# Patient Record
Sex: FEMALE | Race: OTHER | NOT HISPANIC OR LATINO | URBAN - METROPOLITAN AREA
[De-identification: names, ages, dates, MRNs, and addresses within clinical notes are randomized per-mention and may not be internally consistent; named-entity substitution may affect disease eponyms.]

---

## 2018-06-26 ENCOUNTER — EMERGENCY (EMERGENCY)
Facility: HOSPITAL | Age: 43
LOS: 1 days | Discharge: HOME | End: 2018-06-26
Attending: EMERGENCY MEDICINE
Payer: COMMERCIAL

## 2018-06-26 VITALS
HEART RATE: 82 BPM | SYSTOLIC BLOOD PRESSURE: 142 MMHG | RESPIRATION RATE: 17 BRPM | OXYGEN SATURATION: 99 % | DIASTOLIC BLOOD PRESSURE: 86 MMHG

## 2018-06-26 VITALS — HEIGHT: 61 IN | WEIGHT: 143.08 LBS

## 2018-06-26 LAB
ALBUMIN SERPL ELPH-MCNC: 3.7 G/DL — SIGNIFICANT CHANGE UP (ref 3.3–5.2)
ALP SERPL-CCNC: 64 U/L — SIGNIFICANT CHANGE UP (ref 40–120)
ALT FLD-CCNC: 15 U/L — SIGNIFICANT CHANGE UP
ANION GAP SERPL CALC-SCNC: 12 MMOL/L — SIGNIFICANT CHANGE UP (ref 5–17)
APPEARANCE UR: ABNORMAL
AST SERPL-CCNC: 18 U/L — SIGNIFICANT CHANGE UP
BACTERIA # UR AUTO: ABNORMAL
BASOPHILS # BLD AUTO: 0 K/UL — SIGNIFICANT CHANGE UP (ref 0–0.2)
BASOPHILS NFR BLD AUTO: 0.4 % — SIGNIFICANT CHANGE UP (ref 0–2)
BILIRUB SERPL-MCNC: 0.3 MG/DL — LOW (ref 0.4–2)
BILIRUB UR-MCNC: NEGATIVE — SIGNIFICANT CHANGE UP
BUN SERPL-MCNC: 16 MG/DL — SIGNIFICANT CHANGE UP (ref 8–20)
CALCIUM SERPL-MCNC: 9.5 MG/DL — SIGNIFICANT CHANGE UP (ref 8.6–10.2)
CHLORIDE SERPL-SCNC: 102 MMOL/L — SIGNIFICANT CHANGE UP (ref 98–107)
CO2 SERPL-SCNC: 24 MMOL/L — SIGNIFICANT CHANGE UP (ref 22–29)
COLOR SPEC: ABNORMAL
CREAT SERPL-MCNC: 0.86 MG/DL — SIGNIFICANT CHANGE UP (ref 0.5–1.3)
DIFF PNL FLD: ABNORMAL
EOSINOPHIL # BLD AUTO: 0.1 K/UL — SIGNIFICANT CHANGE UP (ref 0–0.5)
EOSINOPHIL NFR BLD AUTO: 1 % — SIGNIFICANT CHANGE UP (ref 0–6)
EPI CELLS # UR: SIGNIFICANT CHANGE UP
GLUCOSE SERPL-MCNC: 88 MG/DL — SIGNIFICANT CHANGE UP (ref 70–115)
GLUCOSE UR QL: NEGATIVE MG/DL — SIGNIFICANT CHANGE UP
HCG SERPL-ACNC: <5 MIU/ML — SIGNIFICANT CHANGE UP
HCG UR QL: NEGATIVE — SIGNIFICANT CHANGE UP
HCT VFR BLD CALC: 36.5 % — LOW (ref 37–47)
HGB BLD-MCNC: 12.2 G/DL — SIGNIFICANT CHANGE UP (ref 12–16)
KETONES UR-MCNC: NEGATIVE — SIGNIFICANT CHANGE UP
LEUKOCYTE ESTERASE UR-ACNC: ABNORMAL
LYMPHOCYTES # BLD AUTO: 1.5 K/UL — SIGNIFICANT CHANGE UP (ref 1–4.8)
LYMPHOCYTES # BLD AUTO: 14 % — LOW (ref 20–55)
MCHC RBC-ENTMCNC: 31.8 PG — HIGH (ref 27–31)
MCHC RBC-ENTMCNC: 33.4 G/DL — SIGNIFICANT CHANGE UP (ref 32–36)
MCV RBC AUTO: 95.1 FL — SIGNIFICANT CHANGE UP (ref 81–99)
MONOCYTES # BLD AUTO: 0.5 K/UL — SIGNIFICANT CHANGE UP (ref 0–0.8)
MONOCYTES NFR BLD AUTO: 4.4 % — SIGNIFICANT CHANGE UP (ref 3–10)
NEUTROPHILS # BLD AUTO: 8.3 K/UL — HIGH (ref 1.8–8)
NEUTROPHILS NFR BLD AUTO: 80 % — HIGH (ref 37–73)
NITRITE UR-MCNC: POSITIVE
PH UR: 8 — SIGNIFICANT CHANGE UP (ref 5–8)
PLATELET # BLD AUTO: 305 K/UL — SIGNIFICANT CHANGE UP (ref 150–400)
POTASSIUM SERPL-MCNC: 4.4 MMOL/L — SIGNIFICANT CHANGE UP (ref 3.5–5.3)
POTASSIUM SERPL-SCNC: 4.4 MMOL/L — SIGNIFICANT CHANGE UP (ref 3.5–5.3)
PROT SERPL-MCNC: 7.1 G/DL — SIGNIFICANT CHANGE UP (ref 6.6–8.7)
PROT UR-MCNC: 30 MG/DL
RBC # BLD: 3.84 M/UL — LOW (ref 4.4–5.2)
RBC # FLD: 13.6 % — SIGNIFICANT CHANGE UP (ref 11–15.6)
RBC CASTS # UR COMP ASSIST: >50 /HPF (ref 0–4)
SODIUM SERPL-SCNC: 138 MMOL/L — SIGNIFICANT CHANGE UP (ref 135–145)
SP GR SPEC: 1.01 — SIGNIFICANT CHANGE UP (ref 1.01–1.02)
UROBILINOGEN FLD QL: NEGATIVE MG/DL — SIGNIFICANT CHANGE UP
WBC # BLD: 10.4 K/UL — SIGNIFICANT CHANGE UP (ref 4.8–10.8)
WBC # FLD AUTO: 10.4 K/UL — SIGNIFICANT CHANGE UP (ref 4.8–10.8)
WBC UR QL: ABNORMAL

## 2018-06-26 PROCEDURE — 76856 US EXAM PELVIC COMPLETE: CPT

## 2018-06-26 PROCEDURE — 36415 COLL VENOUS BLD VENIPUNCTURE: CPT

## 2018-06-26 PROCEDURE — 76830 TRANSVAGINAL US NON-OB: CPT | Mod: 26

## 2018-06-26 PROCEDURE — 80053 COMPREHEN METABOLIC PANEL: CPT

## 2018-06-26 PROCEDURE — 76830 TRANSVAGINAL US NON-OB: CPT

## 2018-06-26 PROCEDURE — 81025 URINE PREGNANCY TEST: CPT

## 2018-06-26 PROCEDURE — 85027 COMPLETE CBC AUTOMATED: CPT

## 2018-06-26 PROCEDURE — 99284 EMERGENCY DEPT VISIT MOD MDM: CPT

## 2018-06-26 PROCEDURE — 76856 US EXAM PELVIC COMPLETE: CPT | Mod: 26

## 2018-06-26 PROCEDURE — 84702 CHORIONIC GONADOTROPIN TEST: CPT

## 2018-06-26 PROCEDURE — 81001 URINALYSIS AUTO W/SCOPE: CPT

## 2018-06-26 RX ORDER — SODIUM CHLORIDE 9 MG/ML
3 INJECTION INTRAMUSCULAR; INTRAVENOUS; SUBCUTANEOUS ONCE
Qty: 0 | Refills: 0 | Status: COMPLETED | OUTPATIENT
Start: 2018-06-26 | End: 2018-06-26

## 2018-06-26 RX ORDER — ACETAMINOPHEN 500 MG
650 TABLET ORAL ONCE
Qty: 0 | Refills: 0 | Status: COMPLETED | OUTPATIENT
Start: 2018-06-26 | End: 2018-06-26

## 2018-06-26 RX ORDER — NITROFURANTOIN MACROCRYSTAL 50 MG
100 CAPSULE ORAL ONCE
Qty: 0 | Refills: 0 | Status: DISCONTINUED | OUTPATIENT
Start: 2018-06-26 | End: 2018-06-26

## 2018-06-26 RX ADMIN — Medication 650 MILLIGRAM(S): at 14:40

## 2018-06-26 RX ADMIN — SODIUM CHLORIDE 3 MILLILITER(S): 9 INJECTION INTRAMUSCULAR; INTRAVENOUS; SUBCUTANEOUS at 14:32

## 2018-06-26 NOTE — ED PROVIDER NOTE - PROGRESS NOTE DETAILS
pt improved. labs and us reviewed. results reviewed with pt including all abnormal results. pt given a copy of results and advised to follow up with obgyn and take ibuprofen for pain. All questions answered and concerns addressed. pt verbalized understanding and agreement with plan and dx. pt advised to follow up with PMD. pt advised to return to ed for worsenng symptoms including fever, cp, sob. will dc.

## 2018-06-26 NOTE — ED PROVIDER NOTE - ATTENDING CONTRIBUTION TO CARE
seen with acp  c/o pelvic pain and bleeding lmp was early June  PE abd sot non tender bs active  cbc ok  ultra sound shows ovarian cyst  will refer to gyn  Agree with acps assessment hx and physical

## 2018-06-26 NOTE — ED PROVIDER NOTE - OBJECTIVE STATEMENT
Pt is a 44yo female with no significant pmhx c/o vaginal bleeding x today. Pt is a 42yo female with no significant pmhx c/o vaginal bleeding x today. pt reports she had her menses first week in june and started to have vaginal bleeding again today, used 4 pads with 1 clot. pt reports Pt is a 42yo female with no significant pmhx c/o vaginal bleeding x today. pt reports she had her menses first week in june and started to have vaginal bleeding again today, used 4 pads with 1 clot. pt reports she is unsure if she is pregnant. pt denies fever, cp, sob, dysuria.

## 2018-06-26 NOTE — ED PROVIDER NOTE - PHYSICAL EXAMINATION
GYN: External genitalia is without lesions, masses or swelling. Mucosa pink. + mild blood  noted in the vault. Cervical OS closed without lesions with mild bleeding. No CMT or adnexal tenderness. Uterus is non-tender and of normal size, shape and consistency. No masses noted.  yue arroyo rn

## 2018-06-26 NOTE — ED ADULT NURSE NOTE - OBJECTIVE STATEMENT
received pt AOx4 in supertrack, c/o abd cramping, dizziness, lethargy and heavy vaginal bleeding. pt states soaked 4 pads in 1 hour. pt denies chest pain and SOB. pt denies fevers, chills, visual changes, n/v/d.  resp even unlabored, in no distress, MAEx4, neuro intact. will continue to monitor

## 2018-06-26 NOTE — ED ADULT TRIAGE NOTE - CHIEF COMPLAINT QUOTE
"I am vaginal bleeding and I don't know if I was pregnant. I had my period Rosa 10- Rosa 15 then it stopped and today I started bleeding, I soaked through 4 pads in 2 hours and am bleeding heavy I have blood clots. "

## 2018-06-26 NOTE — ED PROVIDER NOTE - NONTENDER LOCATION
left lower quadrant/right lower quadrant/periumbilical/left upper quadrant/right upper quadrant/umbilical

## 2018-08-09 ENCOUNTER — EMERGENCY (EMERGENCY)
Facility: HOSPITAL | Age: 43
LOS: 1 days | Discharge: HOME | End: 2018-08-09
Attending: EMERGENCY MEDICINE
Payer: COMMERCIAL

## 2018-08-09 VITALS — HEIGHT: 62 IN | WEIGHT: 145.06 LBS

## 2018-08-09 VITALS
SYSTOLIC BLOOD PRESSURE: 127 MMHG | TEMPERATURE: 98 F | HEART RATE: 70 BPM | DIASTOLIC BLOOD PRESSURE: 83 MMHG | RESPIRATION RATE: 17 BRPM | OXYGEN SATURATION: 99 %

## 2018-08-09 PROCEDURE — 99283 EMERGENCY DEPT VISIT LOW MDM: CPT

## 2018-08-09 RX ADMIN — Medication 1 TABLET(S): at 17:46

## 2018-08-09 NOTE — ED PROVIDER NOTE - NS ED ROS FT
no fever  no chest pain  + ear pain  no SOB  no abd pain  no HA  All other ROS negative except as per HPI

## 2018-08-09 NOTE — ED PROVIDER NOTE - PHYSICAL EXAMINATION
Constitutional: Alert, NAD.   ENT: Airway patent. Nose clear. Mouth with normal mucosa. + erythema, bulging and fluid behind right TM consistent with AOM  Head: Atraumatic.   Eyes: Clear bilaterally. PERRL.   Cardiac: Normal rate.   Respiratory: Breath sounds clear bilaterally.   GI: Abdomen soft, non-tender, no guarding.   : No CVA or bladder tenderness.   Musculoskeletal: FROM, no muscle or joint tenderness or swelling.   Neuro: alert and oriented, no focal deficits, no motor or sensory deficits.   Skin: Dry, intact, no rash.   Psych: normal mood and affect.

## 2018-08-09 NOTE — ED ADULT NURSE NOTE - NSIMPLEMENTINTERV_GEN_ALL_ED
Implemented All Universal Safety Interventions:  [place] to call system. Call bell, personal items and telephone within reach. Instruct patient to call for assistance. Room bathroom lighting operational. Non-slip footwear when patient is off stretcher. Physically safe environment: no spills, clutter or unnecessary equipment. Stretcher in lowest position, wheels locked, appropriate side rails in place.

## 2018-08-09 NOTE — ED PROVIDER NOTE - OBJECTIVE STATEMENT
CC: ear pain  Presenting symptoms: 42yo female c/o right ear pain since last night and had small amount of drainage from ear earlier.  Pertinent Positives: ear pain  Pertinent Negatives: no fever, no abd pain, no cough  Timing: yesterday  Quality: aching  Radiation: none  Severity: moderate  Aggravating Factors: swallowing  Relieving Factors: none    Patient states usually gets one ear infection per year.

## 2018-08-09 NOTE — ED ADULT NURSE NOTE - OBJECTIVE STATEMENT
pt presents to ED with right ear pain since yesterday. pt states there is pus draining from ear. afebrile. will continue to monitor and reassess

## 2018-09-12 NOTE — ED ADULT NURSE NOTE - NSFALLRSKOUTCOME_ED_ALL_ED
Minocycline Pregnancy And Lactation Text: This medication is Pregnancy Category D and not consider safe during pregnancy. It is also excreted in breast milk. Dapsone Pregnancy And Lactation Text: This medication is Pregnancy Category C and is not considered safe during pregnancy or breast feeding. Detail Level: Zone Birth Control Pills Pregnancy And Lactation Text: This medication should be avoided if pregnant and for the first 30 days post-partum. Spironolactone Counseling: Patient advised regarding risks of diarrhea, abdominal pain, hyperkalemia, birth defects (for female patients), liver toxicity and renal toxicity. The patient may need blood work to monitor liver and kidney function and potassium levels while on therapy. The patient verbalized understanding of the proper use and possible adverse effects of spironolactone.  All of the patient's questions and concerns were addressed. Azithromycin Counseling:  I discussed with the patient the risks of azithromycin including but not limited to GI upset, allergic reaction, drug rash, diarrhea, and yeast infections. Tetracycline Counseling: Patient counseled regarding possible photosensitivity and increased risk for sunburn.  Patient instructed to avoid sunlight, if possible.  When exposed to sunlight, patients should wear protective clothing, sunglasses, and sunscreen.  The patient was instructed to call the office immediately if the following severe adverse effects occur:  hearing changes, easy bruising/bleeding, severe headache, or vision changes.  The patient verbalized understanding of the proper use and possible adverse effects of tetracycline.  All of the patient's questions and concerns were addressed. Patient understands to avoid pregnancy while on therapy due to potential birth defects. High Dose Vitamin A Counseling: Side effects reviewed, pt to contact office should one occur. Topical Sulfur Applications Counseling: Topical Sulfur Counseling: Patient counseled that this medication may cause skin irritation or allergic reactions.  In the event of skin irritation, the patient was advised to reduce the amount of the drug applied or use it less frequently.   The patient verbalized understanding of the proper use and possible adverse effects of topical sulfur application.  All of the patient's questions and concerns were addressed. Birth Control Pills Counseling: Birth Control Pill Counseling: I discussed with the patient the potential side effects of OCPs including but not limited to increased risk of stroke, heart attack, thrombophlebitis, deep venous thrombosis, hepatic adenomas, breast changes, GI upset, headaches, and depression.  The patient verbalized understanding of the proper use and possible adverse effects of OCPs. All of the patient's questions and concerns were addressed. Topical Retinoid Pregnancy And Lactation Text: This medication is Pregnancy Category C. It is unknown if this medication is excreted in breast milk. Universal Safety Interventions Isotretinoin Counseling: Patient should get monthly blood tests, not donate blood, not drive at night if vision affected, not share medication, and not undergo elective surgery for 6 months after tx completed. Side effects reviewed, pt to contact office should one occur. Include Pregnancy/Lactation Warning?: No Doxycycline Pregnancy And Lactation Text: This medication is Pregnancy Category D and not consider safe during pregnancy. It is also excreted in breast milk but is considered safe for shorter treatment courses. Topical Clindamycin Counseling: Patient counseled that this medication may cause skin irritation or allergic reactions.  In the event of skin irritation, the patient was advised to reduce the amount of the drug applied or use it less frequently.   The patient verbalized understanding of the proper use and possible adverse effects of clindamycin.  All of the patient's questions and concerns were addressed. Doxycycline Counseling:  Patient counseled regarding possible photosensitivity and increased risk for sunburn.  Patient instructed to avoid sunlight, if possible.  When exposed to sunlight, patients should wear protective clothing, sunglasses, and sunscreen.  The patient was instructed to call the office immediately if the following severe adverse effects occur:  hearing changes, easy bruising/bleeding, severe headache, or vision changes.  The patient verbalized understanding of the proper use and possible adverse effects of doxycycline.  All of the patient's questions and concerns were addressed. High Dose Vitamin A Pregnancy And Lactation Text: High dose vitamin A therapy is contraindicated during pregnancy and breast feeding. Topical Retinoid counseling:  Patient advised to apply a pea-sized amount only at bedtime and wait 30 minutes after washing their face before applying.  If too drying, patient may add a non-comedogenic moisturizer. The patient verbalized understanding of the proper use and possible adverse effects of retinoids.  All of the patient's questions and concerns were addressed. Detail Level: Detailed Erythromycin Counseling:  I discussed with the patient the risks of erythromycin including but not limited to GI upset, allergic reaction, drug rash, diarrhea, increase in liver enzymes, and yeast infections. Tazorac Pregnancy And Lactation Text: This medication is not safe during pregnancy. It is unknown if this medication is excreted in breast milk. Bactrim Counseling:  I discussed with the patient the risks of sulfa antibiotics including but not limited to GI upset, allergic reaction, drug rash, diarrhea, dizziness, photosensitivity, and yeast infections.  Rarely, more serious reactions can occur including but not limited to aplastic anemia, agranulocytosis, methemoglobinemia, blood dyscrasias, liver or kidney failure, lung infiltrates or desquamative/blistering drug rashes. Topical Clindamycin Pregnancy And Lactation Text: This medication is Pregnancy Category B and is considered safe during pregnancy. It is unknown if it is excreted in breast milk. Isotretinoin Pregnancy And Lactation Text: This medication is Pregnancy Category X and is considered extremely dangerous during pregnancy. It is unknown if it is excreted in breast milk. Tazorac Counseling:  Patient advised that medication is irritating and drying.  Patient may need to apply sparingly and wash off after an hour before eventually leaving it on overnight.  The patient verbalized understanding of the proper use and possible adverse effects of tazorac.  All of the patient's questions and concerns were addressed. Topical Sulfur Applications Pregnancy And Lactation Text: This medication is Pregnancy Category C and has an unknown safety profile during pregnancy. It is unknown if this topical medication is excreted in breast milk. Azithromycin Pregnancy And Lactation Text: This medication is considered safe during pregnancy and is also secreted in breast milk. Benzoyl Peroxide Pregnancy And Lactation Text: This medication is Pregnancy Category C. It is unknown if benzoyl peroxide is excreted in breast milk. Benzoyl Peroxide Counseling: Patient counseled that medicine may cause skin irritation and bleach clothing.  In the event of skin irritation, the patient was advised to reduce the amount of the drug applied or use it less frequently.   The patient verbalized understanding of the proper use and possible adverse effects of benzoyl peroxide.  All of the patient's questions and concerns were addressed. Bactrim Pregnancy And Lactation Text: This medication is Pregnancy Category D and is known to cause fetal risk.  It is also excreted in breast milk. Minocycline Counseling: Patient advised regarding possible photosensitivity and discoloration of the teeth, skin, lips, tongue and gums.  Patient instructed to avoid sunlight, if possible.  When exposed to sunlight, patients should wear protective clothing, sunglasses, and sunscreen.  The patient was instructed to call the office immediately if the following severe adverse effects occur:  hearing changes, easy bruising/bleeding, severe headache, or vision changes.  The patient verbalized understanding of the proper use and possible adverse effects of minocycline.  All of the patient's questions and concerns were addressed. Erythromycin Pregnancy And Lactation Text: This medication is Pregnancy Category B and is considered safe during pregnancy. It is also excreted in breast milk. Dapsone Counseling: I discussed with the patient the risks of dapsone including but not limited to hemolytic anemia, agranulocytosis, rashes, methemoglobinemia, kidney failure, peripheral neuropathy, headaches, GI upset, and liver toxicity.  Patients who start dapsone require monitoring including baseline LFTs and weekly CBCs for the first month, then every month thereafter.  The patient verbalized understanding of the proper use and possible adverse effects of dapsone.  All of the patient's questions and concerns were addressed. Spironolactone Pregnancy And Lactation Text: This medication can cause feminization of the male fetus and should be avoided during pregnancy. The active metabolite is also found in breast milk.

## 2018-09-28 ENCOUNTER — EMERGENCY (EMERGENCY)
Facility: HOSPITAL | Age: 43
LOS: 1 days | Discharge: HOME | End: 2018-09-28
Attending: EMERGENCY MEDICINE
Payer: COMMERCIAL

## 2018-09-28 VITALS
HEART RATE: 68 BPM | SYSTOLIC BLOOD PRESSURE: 142 MMHG | OXYGEN SATURATION: 100 % | DIASTOLIC BLOOD PRESSURE: 68 MMHG | RESPIRATION RATE: 18 BRPM | TEMPERATURE: 98 F

## 2018-09-28 VITALS — WEIGHT: 145.06 LBS | HEIGHT: 62 IN

## 2018-09-28 PROBLEM — Z00.00 ENCOUNTER FOR PREVENTIVE HEALTH EXAMINATION: Status: ACTIVE | Noted: 2018-09-28

## 2018-09-28 PROCEDURE — 99283 EMERGENCY DEPT VISIT LOW MDM: CPT | Mod: 25

## 2018-09-28 PROCEDURE — 96372 THER/PROPH/DIAG INJ SC/IM: CPT

## 2018-09-28 PROCEDURE — 99283 EMERGENCY DEPT VISIT LOW MDM: CPT

## 2018-09-28 RX ORDER — METHOCARBAMOL 500 MG/1
1500 TABLET, FILM COATED ORAL ONCE
Qty: 0 | Refills: 0 | Status: COMPLETED | OUTPATIENT
Start: 2018-09-28 | End: 2018-09-28

## 2018-09-28 RX ORDER — METHOCARBAMOL 500 MG/1
2 TABLET, FILM COATED ORAL
Qty: 30 | Refills: 0
Start: 2018-09-28 | End: 2018-10-02

## 2018-09-28 RX ORDER — KETOROLAC TROMETHAMINE 30 MG/ML
60 SYRINGE (ML) INJECTION ONCE
Qty: 0 | Refills: 0 | Status: DISCONTINUED | OUTPATIENT
Start: 2018-09-28 | End: 2018-09-28

## 2018-09-28 RX ADMIN — METHOCARBAMOL 1500 MILLIGRAM(S): 500 TABLET, FILM COATED ORAL at 14:46

## 2018-09-28 RX ADMIN — Medication 60 MILLIGRAM(S): at 14:46

## 2018-09-28 NOTE — ED ADULT NURSE NOTE - NSIMPLEMENTINTERV_GEN_ALL_ED
Implemented All Universal Safety Interventions:  [place] to call system. Call bell, personal items and telephone within reach. Instruct patient to call for assistance. Room bathroom lighting operational. Non-slip footwear when patient is off stretcher. Physically safe environment: no spills, clutter or unnecessary equipment. Stretcher in lowest position, wheels locked, appropriate side rails in place.

## 2018-09-28 NOTE — ED PROVIDER NOTE - MUSCULOSKELETAL MINIMAL EXAM
atraumatic/normal range of motion/No bruising or signs of trauma. Able to straight leg raise bilaterally, no midline ttp, tenderness to paraspinal lumbar region. Able to ambulate./TENDERNESS/motor intact

## 2018-09-28 NOTE — ED PROVIDER NOTE - ATTENDING CONTRIBUTION TO CARE
case d/w ACP: reports positional  back pain since last night without radiation, numbness  or symptoms suggestive cauda equina syndrome.  No focal abnormalities reported on exam and patient able to ambulate normally.  Agree with plan for supportive care.

## 2018-09-28 NOTE — ED ADULT TRIAGE NOTE - CHIEF COMPLAINT QUOTE
Pt presents to ED for eval of lower back pain radiating down to right to calf upon movement. Pt states that she is constantly bending, pushing and pulling while at work and thinks the pain is due to the labor. Pt denies direct injury or trauma to back.

## 2018-09-28 NOTE — ED PROVIDER NOTE - OBJECTIVE STATEMENT
43 year old female with a past medical history of HTN (not on medication) presents to the ED complaining of lower, bilateral, intermittent, "sharp, achy, throbbing," 10/10 back pain since last night. Patient works cleaning hotel rooms and states the pain started after work and she thinks she pulled a muscle. Patient took 2 Motrin for the pain at 11 AM today with minimal relief. Patient states the pain is worse with movement and denies urinary and bowel incontinence, radiation, numbness/tingling, chest pain, abdominal pain, history of back problems, or fever.

## 2018-09-28 NOTE — ED PROVIDER NOTE - CHPI ED SYMPTOMS NEG
no difficulty bearing weight/no bladder dysfunction/no bowel dysfunction/no tingling/no motor function loss/no numbness

## 2018-09-28 NOTE — ED PROVIDER NOTE - NEUROLOGICAL, MLM
Alert and oriented, no focal deficits, no motor or sensory deficits. Patellar reflexes intact bilaterally.

## 2018-10-04 ENCOUNTER — APPOINTMENT (OUTPATIENT)
Dept: ORTHOPEDIC SURGERY | Facility: CLINIC | Age: 43
End: 2018-10-04
Payer: MEDICAID

## 2018-10-04 VITALS
WEIGHT: 148.2 LBS | HEIGHT: 62 IN | HEART RATE: 69 BPM | BODY MASS INDEX: 27.27 KG/M2 | DIASTOLIC BLOOD PRESSURE: 108 MMHG | SYSTOLIC BLOOD PRESSURE: 160 MMHG

## 2018-10-04 DIAGNOSIS — M54.5 LOW BACK PAIN: ICD-10-CM

## 2018-10-04 DIAGNOSIS — Z78.9 OTHER SPECIFIED HEALTH STATUS: ICD-10-CM

## 2018-10-04 DIAGNOSIS — F17.200 NICOTINE DEPENDENCE, UNSPECIFIED, UNCOMPLICATED: ICD-10-CM

## 2018-10-04 DIAGNOSIS — M43.17 SPONDYLOLISTHESIS, LUMBOSACRAL REGION: ICD-10-CM

## 2018-10-04 DIAGNOSIS — Z86.39 PERSONAL HISTORY OF OTHER ENDOCRINE, NUTRITIONAL AND METABOLIC DISEASE: ICD-10-CM

## 2018-10-04 PROCEDURE — 72100 X-RAY EXAM L-S SPINE 2/3 VWS: CPT

## 2018-10-04 PROCEDURE — 99204 OFFICE O/P NEW MOD 45 MIN: CPT

## 2018-11-15 ENCOUNTER — APPOINTMENT (OUTPATIENT)
Dept: ORTHOPEDIC SURGERY | Facility: CLINIC | Age: 43
End: 2018-11-15

## 2018-12-21 ENCOUNTER — EMERGENCY (EMERGENCY)
Facility: HOSPITAL | Age: 43
LOS: 1 days | Discharge: HOME | End: 2018-12-21
Attending: EMERGENCY MEDICINE
Payer: COMMERCIAL

## 2018-12-21 VITALS
OXYGEN SATURATION: 100 % | HEART RATE: 86 BPM | TEMPERATURE: 99 F | DIASTOLIC BLOOD PRESSURE: 77 MMHG | SYSTOLIC BLOOD PRESSURE: 134 MMHG | RESPIRATION RATE: 16 BRPM

## 2018-12-21 VITALS — WEIGHT: 143.96 LBS | HEIGHT: 62 IN

## 2018-12-21 PROCEDURE — 99283 EMERGENCY DEPT VISIT LOW MDM: CPT

## 2018-12-21 NOTE — ED PROVIDER NOTE - ATTENDING CONTRIBUTION TO CARE
itchy rash to posterior shoulder, arm and flank for 3 days;  using rubbing alcohol to "dry it out".  Denies fever, or URI.  PE:  annular lesions with secondary scaling, without raised border and no central clearing to right posterior shoulder, left arm and right flank.  Psoriasis vs EAC vs pityriasis, doubt tinea corporis but will treat with topical antifungal and advised derm f/u

## 2018-12-21 NOTE — ED PROVIDER NOTE - MEDICAL DECISION MAKING DETAILS
PT with stable VS, no acute distress, non toxic appearing, tolerating PO in the ED, PT with likely inflammatory reaction due to new soap and detergent will tx with topical steroids dc home with follow up to derm. educated about when to return to the ED if needed. PT verbalizes that he understands all instructions and results.

## 2018-12-21 NOTE — ED PROVIDER NOTE - OBJECTIVE STATEMENT
PT with no SPMHx presents to the ED with complaint of rash x3 days. Pt states that 3 days ago she noticed that she spontaneously started to have small spot of itchiness on her LT shoulder that started to become red and raised that she cleaned with alcohol. PT states that the next day she noticed that she had 2 more lesson on her Rt back and ribs. PT states that itching has improved and is mild in nature. pt states that she recently started to use a new bar soap and detergent. pt denies exposure to new foods, or medications. PT denies fever, chills, weakness, numbness, tingling, back pain.

## 2019-06-06 ENCOUNTER — EMERGENCY (EMERGENCY)
Facility: HOSPITAL | Age: 44
LOS: 1 days | Discharge: HOME | End: 2019-06-06
Attending: EMERGENCY MEDICINE
Payer: COMMERCIAL

## 2019-06-06 VITALS
WEIGHT: 149.91 LBS | HEIGHT: 61 IN | RESPIRATION RATE: 16 BRPM | HEART RATE: 96 BPM | DIASTOLIC BLOOD PRESSURE: 88 MMHG | SYSTOLIC BLOOD PRESSURE: 142 MMHG | TEMPERATURE: 99 F | OXYGEN SATURATION: 95 %

## 2019-06-06 LAB
ANION GAP SERPL CALC-SCNC: 9 MMOL/L — SIGNIFICANT CHANGE UP (ref 5–17)
APTT BLD: 27.7 SEC — SIGNIFICANT CHANGE UP (ref 27.5–36.3)
BUN SERPL-MCNC: 11 MG/DL — SIGNIFICANT CHANGE UP (ref 8–20)
CALCIUM SERPL-MCNC: 9.5 MG/DL — SIGNIFICANT CHANGE UP (ref 8.6–10.2)
CHLORIDE SERPL-SCNC: 106 MMOL/L — SIGNIFICANT CHANGE UP (ref 98–107)
CK SERPL-CCNC: 103 U/L — SIGNIFICANT CHANGE UP (ref 25–170)
CO2 SERPL-SCNC: 24 MMOL/L — SIGNIFICANT CHANGE UP (ref 22–29)
CREAT SERPL-MCNC: 0.8 MG/DL — SIGNIFICANT CHANGE UP (ref 0.5–1.3)
GLUCOSE SERPL-MCNC: 86 MG/DL — SIGNIFICANT CHANGE UP (ref 70–115)
HCG SERPL-ACNC: <4 MIU/ML — SIGNIFICANT CHANGE UP
HCT VFR BLD CALC: 38.1 % — SIGNIFICANT CHANGE UP (ref 37–47)
HGB BLD-MCNC: 12.9 G/DL — SIGNIFICANT CHANGE UP (ref 12–16)
INR BLD: 1.01 RATIO — SIGNIFICANT CHANGE UP (ref 0.88–1.16)
MCHC RBC-ENTMCNC: 32.6 PG — HIGH (ref 27–31)
MCHC RBC-ENTMCNC: 33.9 G/DL — SIGNIFICANT CHANGE UP (ref 32–36)
MCV RBC AUTO: 96.2 FL — SIGNIFICANT CHANGE UP (ref 81–99)
PLATELET # BLD AUTO: 160 K/UL — SIGNIFICANT CHANGE UP (ref 150–400)
POTASSIUM SERPL-MCNC: 4 MMOL/L — SIGNIFICANT CHANGE UP (ref 3.5–5.3)
POTASSIUM SERPL-SCNC: 4 MMOL/L — SIGNIFICANT CHANGE UP (ref 3.5–5.3)
PROTHROM AB SERPL-ACNC: 11.6 SEC — SIGNIFICANT CHANGE UP (ref 10–12.9)
RBC # BLD: 3.96 M/UL — LOW (ref 4.4–5.2)
RBC # FLD: 13.4 % — SIGNIFICANT CHANGE UP (ref 11–15.6)
SODIUM SERPL-SCNC: 139 MMOL/L — SIGNIFICANT CHANGE UP (ref 135–145)
TROPONIN T SERPL-MCNC: <0.01 NG/ML — SIGNIFICANT CHANGE UP (ref 0–0.06)
WBC # BLD: 9.8 K/UL — SIGNIFICANT CHANGE UP (ref 4.8–10.8)
WBC # FLD AUTO: 9.8 K/UL — SIGNIFICANT CHANGE UP (ref 4.8–10.8)

## 2019-06-06 PROCEDURE — 36415 COLL VENOUS BLD VENIPUNCTURE: CPT

## 2019-06-06 PROCEDURE — 80048 BASIC METABOLIC PNL TOTAL CA: CPT

## 2019-06-06 PROCEDURE — 84702 CHORIONIC GONADOTROPIN TEST: CPT

## 2019-06-06 PROCEDURE — 71046 X-RAY EXAM CHEST 2 VIEWS: CPT | Mod: 26

## 2019-06-06 PROCEDURE — 85027 COMPLETE CBC AUTOMATED: CPT

## 2019-06-06 PROCEDURE — 93010 ELECTROCARDIOGRAM REPORT: CPT

## 2019-06-06 PROCEDURE — 85730 THROMBOPLASTIN TIME PARTIAL: CPT

## 2019-06-06 PROCEDURE — 99285 EMERGENCY DEPT VISIT HI MDM: CPT

## 2019-06-06 PROCEDURE — 99284 EMERGENCY DEPT VISIT MOD MDM: CPT | Mod: 25

## 2019-06-06 PROCEDURE — 85610 PROTHROMBIN TIME: CPT

## 2019-06-06 PROCEDURE — 82550 ASSAY OF CK (CPK): CPT

## 2019-06-06 PROCEDURE — 84484 ASSAY OF TROPONIN QUANT: CPT

## 2019-06-06 PROCEDURE — 96374 THER/PROPH/DIAG INJ IV PUSH: CPT

## 2019-06-06 PROCEDURE — 93005 ELECTROCARDIOGRAM TRACING: CPT

## 2019-06-06 PROCEDURE — 71046 X-RAY EXAM CHEST 2 VIEWS: CPT

## 2019-06-06 RX ORDER — METHOCARBAMOL 500 MG/1
1 TABLET, FILM COATED ORAL
Qty: 15 | Refills: 0
Start: 2019-06-06 | End: 2019-06-10

## 2019-06-06 RX ORDER — KETOROLAC TROMETHAMINE 30 MG/ML
15 SYRINGE (ML) INJECTION ONCE
Refills: 0 | Status: DISCONTINUED | OUTPATIENT
Start: 2019-06-06 | End: 2019-06-06

## 2019-06-06 RX ADMIN — Medication 15 MILLIGRAM(S): at 10:54

## 2019-06-06 NOTE — ED STATDOCS - EKG ADDITIONAL INFORMATION FREE TEXT
94bpm, NSR no acuet ischemic changes, normal axis. no rt heart strain. nonspecific ST-changes/flatening 94bpm, NSR no acute ischemic changes, normal axis. no rt heart strain. nonspecific ST-changes/flatening

## 2019-06-06 NOTE — ED STATDOCS - ATTENDING CONTRIBUTION TO CARE
I, Shaniqua Coughlin, performed the initial face to face bedside interview with this patient regarding history of present illness, review of symptoms and relevant past medical, social and family history.  I completed an independent physical examination.   The medical decision making and follow-up on ordered tests (ie labs, radiologic studies) and re-evaluation of the patient's status has been communicated to the ACP.  Disposition of the patient will be based on test outcome and response to ED interventions.  The history, relevant review of systems, past medical and surgical history, medical decision making, and physical examination was documented by the scribe in my presence and I attest to the accuracy of the documentation.

## 2019-06-06 NOTE — ED ADULT TRIAGE NOTE - CHIEF COMPLAINT QUOTE
pt c/o muscle cramps.  states she has been having "paola horses in my calves".  also reports chest tightness.

## 2019-06-06 NOTE — ED STATDOCS - PHYSICAL EXAMINATION
Gen: NAD, AOx3  Head: NCAT  HEENT: PERRL, EOMI, oral mucosa moist, normal conjunctiva, neck supple  Lung: CTAB, no respiratory distress  CV: rrr, no murmur, Normal perfusion  Abd: soft, NTND, no CVA tenderness  MSK: No edema, no visible deformities  Neuro: No focal neurologic deficits  Skin: No rash   Psych: normal affect Gen: NAD, AOx3  Head: NCAT  HEENT: PERRL, EOMI, oral mucosa moist, normal conjunctiva, neck supple  Lung: CTAB, no respiratory distress  CV: rrr, no murmur, Normal perfusion  Abd: soft, NTND, no CVA tenderness  MSK: No edema, no visible deformities, no calf tenderness, no midline spinal tenderness; tenderness to right paravertebral gluteal region   Neuro: CN II-XII intact, 5/5 global strength, sensation intact, except for subjective decreased sensation at lateral aspect of lower leg from knee to ankle, no dysmetria/ataxia, gait intact   Skin: No rash   Psych: normal affect Gen: NAD, AOx3  Head: NCAT  HEENT: EOMI, oral mucosa moist, normal conjunctiva, neck supple  Lung: CTAB, no respiratory distress  CV: rrr, no murmur, Normal perfusion  Abd: soft, NTNDs  MSK: No edema, no visible deformities, no calf tenderness, no midline spinal tenderness; tenderness to right paravertebral gluteal region   Neuro: 5/5 global strength, sensation intact except for subjective decreased sensation at lateral aspect of lower leg from knee to ankle, no dysmetria/ataxia, gait intact   Skin: No rash   Psych: normal affect

## 2019-06-06 NOTE — ED STATDOCS - OBJECTIVE STATEMENT
44 y/o F pt with no PMHx presents to the ED c/o muscle pain, onset of 2 days. Patient states that she has been having tingling, and "pins and needles" in her right lower leg. She has also been having chest pain and tightness, with associated SOB, that began yesterday while patient was at work. States that she has also been having a runny nose and cough. Patient states that she walks a lot at work. Denies fever.  LMP: May 10th; Patient is a smoker. 44 y/o F pt with no PMHx presents to the ED c/o muscle pain, onset of 2 days. Patient states that she has been having tingling, and "pins and needles" in her right lower leg. She has also been having chest pain and tightness, with associated SOB, that began yesterday while patient was at work. States that she has also been having a runny nose and cough. CP and SOB in setting of cough. not exertional. no orthopnea. no LE edema. Patient states that she walks a lot at work. Denies fever.  LMP: May 10th; Patient is a smoker.

## 2019-06-06 NOTE — ED STATDOCS - NS ED ROS FT
ROS: (+) CP, SOB. (+) cough. no fever. no n/v/d/c. no abd pain. no rash. no bleeding. no urinary complaints. no weakness. no vision changes. no HA. no neck/back pain. no extremity swelling/deformity. No change in mental status.   (+) right leg tingling/ "pins and needles"  (+) runny nose

## 2019-06-06 NOTE — ED STATDOCS - CLINICAL SUMMARY MEDICAL DECISION MAKING FREE TEXT BOX
Patient with subjective paresthesia, in right leg also with back pain, possible never impingent. No calf tenderness or swelling.  Patient also with CP and SOB in setting of productive cough. No fever. Pt is a smoker.   PERC: negative  Will check screening EKG, EKG trop x1, XR, re-asses  No concern for CVA or cord compression. Patient with subjective paresthesia, in right leg also with back pain, possible never impingent. No calf tenderness or swelling.  Patient also with CP and SOB in setting of productive cough. No fever. Pt is a smoker. possible bronchitis, low risk ACS. PERC: negative  Will check screening EKG, EKG trop x1, XR, re-asses  No concern for CVA or cord compression.

## 2019-06-06 NOTE — ED STATDOCS - PROGRESS NOTE DETAILS
pt is initially seen By Dr fuller agreed with hx , PE and plan   seen  the pt at the bedside NAd , no compliant now . labs W.o any acute finding, CXR d.w dr fuller not acute finding , pt has PCP dr iqbal will given her the result F.u pcp pt is initially seen By Dr fuller agreed with hx , PE and plan   seen  the pt at the bedside NAd ,denies any c.p or SOB .  no compliant now . labs W.o any acute finding, CXR d.w dr fuller not acute finding , pt has PCP dr iqbal will given her the result F.u pcp

## 2019-06-06 NOTE — ED ADULT NURSE NOTE - NSIMPLEMENTINTERV_GEN_ALL_ED
Implemented All Universal Safety Interventions:  [place] to call system. Call bell, personal items and telephone within reach. Instruct patient to call for assistance. Room bathroom lighting operational. Non-slip footwear when patient is off stretcher. Physically safe environment: no spills, clutter or unnecessary equipment. Stretcher in lowest position, wheels locked, appropriate side rails in place.

## 2019-08-20 ENCOUNTER — EMERGENCY (EMERGENCY)
Facility: HOSPITAL | Age: 44
LOS: 1 days | Discharge: HOME | End: 2019-08-20
Attending: EMERGENCY MEDICINE
Payer: COMMERCIAL

## 2019-08-20 VITALS
WEIGHT: 149.91 LBS | TEMPERATURE: 98 F | HEIGHT: 62 IN | DIASTOLIC BLOOD PRESSURE: 76 MMHG | HEART RATE: 74 BPM | RESPIRATION RATE: 16 BRPM | OXYGEN SATURATION: 100 % | SYSTOLIC BLOOD PRESSURE: 131 MMHG

## 2019-08-20 PROCEDURE — 99283 EMERGENCY DEPT VISIT LOW MDM: CPT

## 2019-08-20 PROCEDURE — 73030 X-RAY EXAM OF SHOULDER: CPT

## 2019-08-20 PROCEDURE — 73030 X-RAY EXAM OF SHOULDER: CPT | Mod: 26,LT

## 2019-08-20 RX ORDER — IBUPROFEN 200 MG
600 TABLET ORAL ONCE
Refills: 0 | Status: COMPLETED | OUTPATIENT
Start: 2019-08-20 | End: 2019-08-20

## 2019-08-20 RX ORDER — IBUPROFEN 200 MG
1 TABLET ORAL
Qty: 28 | Refills: 0
Start: 2019-08-20 | End: 2019-08-26

## 2019-08-20 RX ADMIN — Medication 600 MILLIGRAM(S): at 13:32

## 2019-08-20 RX ADMIN — Medication 600 MILLIGRAM(S): at 12:58

## 2019-08-20 NOTE — ED PROVIDER NOTE - CROS ED SKIN ALL NEG
Essential hypertension    Malignant neoplasm of left female breast, unspecified estrogen receptor status, unspecified site of breast
negative...

## 2019-08-20 NOTE — ED PROVIDER NOTE - OBJECTIVE STATEMENT
Pt is a 44y F presenting with L shoulder pain s/p moving furniture over the weekend. Pt states she was taking motrin at home. Last dose was yesterday. She has decreased ROM and pain to the anterior and posterior L shoulder. No other injuries no neck tenderness. No chest pain/SOB/cardiac hx.

## 2019-10-18 NOTE — ED ADULT NURSE NOTE - NS ED NURSE DISCH DISPOSITION
Acute decompensated heart failure, SOB Acute decompensated heart failure, SOB Acute decompensated heart failure, SOB Acute decompensated heart failure, SOB Acute decompensated heart failure, SOB Acute decompensated heart failure, SOB Acute decompensated heart failure, SOB Acute decompensated heart failure, SOB Acute decompensated heart failure, SOB Acute decompensated heart failure, SOB Acute decompensated heart failure, SOB Acute decompensated heart failure, SOB Acute decompensated heart failure, SOB Acute decompensated heart failure, SOB Acute decompensated heart failure, SOB Acute decompensated heart failure, SOB Acute decompensated heart failure, SOB Acute decompensated heart failure, SOB Acute decompensated heart failure, SOB Acute decompensated heart failure, SOB Acute decompensated heart failure, SOB Acute decompensated heart failure, SOB Acute decompensated heart failure, SOB Acute decompensated heart failure, SOB Acute decompensated heart failure, SOB Acute decompensated heart failure, SOB Acute decompensated heart failure, SOB Acute decompensated heart failure, SOB Acute decompensated heart failure, SOB Acute decompensated heart failure, SOB Acute decompensated heart failure, SOB Acute decompensated heart failure, SOB Acute decompensated heart failure, SOB Acute decompensated heart failure, SOB Acute decompensated heart failure, SOB Acute decompensated heart failure, SOB Acute decompensated heart failure, SOB Acute decompensated heart failure, SOB Acute decompensated heart failure, SOB Acute decompensated heart failure, SOB Acute decompensated heart failure, SOB Acute decompensated heart failure, SOB Acute decompensated heart failure, SOB Acute decompensated heart failure, SOB Acute decompensated heart failure, SOB Discharged

## 2019-11-01 ENCOUNTER — EMERGENCY (EMERGENCY)
Facility: HOSPITAL | Age: 44
LOS: 1 days | Discharge: HOME | End: 2019-11-01
Attending: STUDENT IN AN ORGANIZED HEALTH CARE EDUCATION/TRAINING PROGRAM
Payer: SELF-PAY

## 2019-11-01 VITALS
HEIGHT: 62 IN | WEIGHT: 149.91 LBS | DIASTOLIC BLOOD PRESSURE: 93 MMHG | HEART RATE: 76 BPM | SYSTOLIC BLOOD PRESSURE: 161 MMHG | OXYGEN SATURATION: 100 % | TEMPERATURE: 98 F | RESPIRATION RATE: 20 BRPM

## 2019-11-01 PROCEDURE — 99283 EMERGENCY DEPT VISIT LOW MDM: CPT

## 2019-11-01 NOTE — ED STATDOCS - NS ED ROS FT
Const:No fever/chills  EENMT: No photophobia/eye pain/changes in vision, No ear pain/sore throat/dysphagia, +gum pain and swelling   Cardiac: No chest pain/palpitations  Resp: no SOB/cough/wheeze/stridor  Abd: No abdominal pain, No N/V/D  : no dysuria/frequency/discharge  MSK: No neck/back pain  Skin: no rash  Neuro: no changes in neurological status/function.

## 2019-11-01 NOTE — ED STATDOCS - OBJECTIVE STATEMENT
Pt is a 43 y/o F presenting to the ED with c/o dental pain, onset 3-4 days ago. Pt states her gums have been gradually becoming more inflamed. Went to the dentist today but advised to come to the ED fo r further eval. Denies fever. Pt states she has dentures.

## 2019-11-01 NOTE — ED STATDOCS - CLINICAL SUMMARY MEDICAL DECISION MAKING FREE TEXT BOX
Pt with dental swelling/infection. Will start augmentin. Instructed to f/u with dentist for further management. Advised to return for new or worsening sxs.

## 2019-11-01 NOTE — ED STATDOCS - PHYSICAL EXAMINATION
Constitutional - well-developed; well nourished.   Head - NCAT. Airway patent.   ENT: swelling to the anterior lower gingiva, no swelling or tenderness to the floor of the mouth  Eyes - PERRL.   CV - RRR. no murmur. no edema.   Pulm - CTAB.   Abd - soft, nt. no rebound. no guarding.   Neuro - A&Ox3. strength 5/5 x4. sensation intact x4. normal gait.   Skin - No rash.   MSK - normal ROM.

## 2019-11-01 NOTE — ED STATDOCS - PATIENT PORTAL LINK FT
You can access the FollowMyHealth Patient Portal offered by [facility] by registering at the following website: [email or web address]. By joining [employer]’s FollowMyHealth portal, you will also be able to view your health information using other applications (apps) compatible with our system.

## 2020-01-15 ENCOUNTER — EMERGENCY (EMERGENCY)
Facility: HOSPITAL | Age: 45
LOS: 1 days | Discharge: HOME | End: 2020-01-15
Attending: EMERGENCY MEDICINE
Payer: COMMERCIAL

## 2020-01-15 VITALS
DIASTOLIC BLOOD PRESSURE: 87 MMHG | RESPIRATION RATE: 18 BRPM | TEMPERATURE: 98 F | WEIGHT: 169.98 LBS | HEIGHT: 62 IN | HEART RATE: 76 BPM | SYSTOLIC BLOOD PRESSURE: 143 MMHG | OXYGEN SATURATION: 100 %

## 2020-01-15 VITALS
TEMPERATURE: 98 F | DIASTOLIC BLOOD PRESSURE: 83 MMHG | HEART RATE: 60 BPM | SYSTOLIC BLOOD PRESSURE: 129 MMHG | RESPIRATION RATE: 20 BRPM | OXYGEN SATURATION: 100 %

## 2020-01-15 LAB
ALBUMIN SERPL ELPH-MCNC: 4.1 G/DL — SIGNIFICANT CHANGE UP (ref 3.3–5.2)
ALP SERPL-CCNC: 66 U/L — SIGNIFICANT CHANGE UP (ref 40–120)
ALT FLD-CCNC: 10 U/L — SIGNIFICANT CHANGE UP
ANION GAP SERPL CALC-SCNC: 11 MMOL/L — SIGNIFICANT CHANGE UP (ref 5–17)
APPEARANCE UR: CLEAR — SIGNIFICANT CHANGE UP
AST SERPL-CCNC: 22 U/L — SIGNIFICANT CHANGE UP
BACTERIA # UR AUTO: NEGATIVE — SIGNIFICANT CHANGE UP
BASOPHILS # BLD AUTO: 0.07 K/UL — SIGNIFICANT CHANGE UP (ref 0–0.2)
BASOPHILS NFR BLD AUTO: 1 % — SIGNIFICANT CHANGE UP (ref 0–2)
BILIRUB SERPL-MCNC: 0.4 MG/DL — SIGNIFICANT CHANGE UP (ref 0.4–2)
BILIRUB UR-MCNC: NEGATIVE — SIGNIFICANT CHANGE UP
BUN SERPL-MCNC: 10 MG/DL — SIGNIFICANT CHANGE UP (ref 8–20)
CALCIUM SERPL-MCNC: 9.6 MG/DL — SIGNIFICANT CHANGE UP (ref 8.6–10.2)
CHLORIDE SERPL-SCNC: 104 MMOL/L — SIGNIFICANT CHANGE UP (ref 98–107)
CO2 SERPL-SCNC: 24 MMOL/L — SIGNIFICANT CHANGE UP (ref 22–29)
COLOR SPEC: YELLOW — SIGNIFICANT CHANGE UP
CREAT SERPL-MCNC: 0.76 MG/DL — SIGNIFICANT CHANGE UP (ref 0.5–1.3)
DIFF PNL FLD: NEGATIVE — SIGNIFICANT CHANGE UP
EOSINOPHIL # BLD AUTO: 0.08 K/UL — SIGNIFICANT CHANGE UP (ref 0–0.5)
EOSINOPHIL NFR BLD AUTO: 1.1 % — SIGNIFICANT CHANGE UP (ref 0–6)
EPI CELLS # UR: SIGNIFICANT CHANGE UP
GLUCOSE SERPL-MCNC: 89 MG/DL — SIGNIFICANT CHANGE UP (ref 70–115)
GLUCOSE UR QL: NEGATIVE MG/DL — SIGNIFICANT CHANGE UP
HCG SERPL-ACNC: <4 MIU/ML — SIGNIFICANT CHANGE UP
HCG UR QL: NEGATIVE — SIGNIFICANT CHANGE UP
HCT VFR BLD CALC: 38.3 % — SIGNIFICANT CHANGE UP (ref 34.5–45)
HGB BLD-MCNC: 12.5 G/DL — SIGNIFICANT CHANGE UP (ref 11.5–15.5)
IMM GRANULOCYTES NFR BLD AUTO: 0.3 % — SIGNIFICANT CHANGE UP (ref 0–1.5)
KETONES UR-MCNC: NEGATIVE — SIGNIFICANT CHANGE UP
LEUKOCYTE ESTERASE UR-ACNC: ABNORMAL
LIDOCAIN IGE QN: 27 U/L — SIGNIFICANT CHANGE UP (ref 22–51)
LYMPHOCYTES # BLD AUTO: 1.53 K/UL — SIGNIFICANT CHANGE UP (ref 1–3.3)
LYMPHOCYTES # BLD AUTO: 21.1 % — SIGNIFICANT CHANGE UP (ref 13–44)
MCHC RBC-ENTMCNC: 32.1 PG — SIGNIFICANT CHANGE UP (ref 27–34)
MCHC RBC-ENTMCNC: 32.6 GM/DL — SIGNIFICANT CHANGE UP (ref 32–36)
MCV RBC AUTO: 98.2 FL — SIGNIFICANT CHANGE UP (ref 80–100)
MONOCYTES # BLD AUTO: 0.58 K/UL — SIGNIFICANT CHANGE UP (ref 0–0.9)
MONOCYTES NFR BLD AUTO: 8 % — SIGNIFICANT CHANGE UP (ref 2–14)
NEUTROPHILS # BLD AUTO: 4.98 K/UL — SIGNIFICANT CHANGE UP (ref 1.8–7.4)
NEUTROPHILS NFR BLD AUTO: 68.5 % — SIGNIFICANT CHANGE UP (ref 43–77)
NITRITE UR-MCNC: NEGATIVE — SIGNIFICANT CHANGE UP
PH UR: 8 — SIGNIFICANT CHANGE UP (ref 5–8)
PLATELET # BLD AUTO: 175 K/UL — SIGNIFICANT CHANGE UP (ref 150–400)
POTASSIUM SERPL-MCNC: 4.5 MMOL/L — SIGNIFICANT CHANGE UP (ref 3.5–5.3)
POTASSIUM SERPL-SCNC: 4.5 MMOL/L — SIGNIFICANT CHANGE UP (ref 3.5–5.3)
PROT SERPL-MCNC: 7.5 G/DL — SIGNIFICANT CHANGE UP (ref 6.6–8.7)
PROT UR-MCNC: 15 MG/DL
RBC # BLD: 3.9 M/UL — SIGNIFICANT CHANGE UP (ref 3.8–5.2)
RBC # FLD: 13.4 % — SIGNIFICANT CHANGE UP (ref 10.3–14.5)
RBC CASTS # UR COMP ASSIST: NEGATIVE /HPF — SIGNIFICANT CHANGE UP (ref 0–4)
SODIUM SERPL-SCNC: 139 MMOL/L — SIGNIFICANT CHANGE UP (ref 135–145)
SP GR SPEC: 1.01 — SIGNIFICANT CHANGE UP (ref 1.01–1.02)
UROBILINOGEN FLD QL: 1 MG/DL
WBC # BLD: 7.26 K/UL — SIGNIFICANT CHANGE UP (ref 3.8–10.5)
WBC # FLD AUTO: 7.26 K/UL — SIGNIFICANT CHANGE UP (ref 3.8–10.5)
WBC UR QL: SIGNIFICANT CHANGE UP

## 2020-01-15 PROCEDURE — 76856 US EXAM PELVIC COMPLETE: CPT

## 2020-01-15 PROCEDURE — 74177 CT ABD & PELVIS W/CONTRAST: CPT

## 2020-01-15 PROCEDURE — 81001 URINALYSIS AUTO W/SCOPE: CPT

## 2020-01-15 PROCEDURE — 76830 TRANSVAGINAL US NON-OB: CPT | Mod: 26

## 2020-01-15 PROCEDURE — 99285 EMERGENCY DEPT VISIT HI MDM: CPT

## 2020-01-15 PROCEDURE — 80074 ACUTE HEPATITIS PANEL: CPT

## 2020-01-15 PROCEDURE — 81025 URINE PREGNANCY TEST: CPT

## 2020-01-15 PROCEDURE — 96374 THER/PROPH/DIAG INJ IV PUSH: CPT | Mod: XU

## 2020-01-15 PROCEDURE — 74177 CT ABD & PELVIS W/CONTRAST: CPT | Mod: 26

## 2020-01-15 PROCEDURE — 36415 COLL VENOUS BLD VENIPUNCTURE: CPT

## 2020-01-15 PROCEDURE — 84702 CHORIONIC GONADOTROPIN TEST: CPT

## 2020-01-15 PROCEDURE — 71046 X-RAY EXAM CHEST 2 VIEWS: CPT | Mod: 26

## 2020-01-15 PROCEDURE — 76830 TRANSVAGINAL US NON-OB: CPT

## 2020-01-15 PROCEDURE — 85027 COMPLETE CBC AUTOMATED: CPT

## 2020-01-15 PROCEDURE — 71046 X-RAY EXAM CHEST 2 VIEWS: CPT

## 2020-01-15 PROCEDURE — 96375 TX/PRO/DX INJ NEW DRUG ADDON: CPT

## 2020-01-15 PROCEDURE — 76856 US EXAM PELVIC COMPLETE: CPT | Mod: 26

## 2020-01-15 PROCEDURE — 83690 ASSAY OF LIPASE: CPT

## 2020-01-15 PROCEDURE — 80053 COMPREHEN METABOLIC PANEL: CPT

## 2020-01-15 PROCEDURE — 99284 EMERGENCY DEPT VISIT MOD MDM: CPT | Mod: 25

## 2020-01-15 RX ORDER — SODIUM CHLORIDE 9 MG/ML
1000 INJECTION INTRAMUSCULAR; INTRAVENOUS; SUBCUTANEOUS ONCE
Refills: 0 | Status: COMPLETED | OUTPATIENT
Start: 2020-01-15 | End: 2020-01-15

## 2020-01-15 RX ORDER — MORPHINE SULFATE 50 MG/1
4 CAPSULE, EXTENDED RELEASE ORAL ONCE
Refills: 0 | Status: DISCONTINUED | OUTPATIENT
Start: 2020-01-15 | End: 2020-01-15

## 2020-01-15 RX ORDER — ONDANSETRON 8 MG/1
4 TABLET, FILM COATED ORAL ONCE
Refills: 0 | Status: COMPLETED | OUTPATIENT
Start: 2020-01-15 | End: 2020-01-15

## 2020-01-15 RX ADMIN — SODIUM CHLORIDE 1000 MILLILITER(S): 9 INJECTION INTRAMUSCULAR; INTRAVENOUS; SUBCUTANEOUS at 14:16

## 2020-01-15 RX ADMIN — MORPHINE SULFATE 4 MILLIGRAM(S): 50 CAPSULE, EXTENDED RELEASE ORAL at 14:06

## 2020-01-15 RX ADMIN — ONDANSETRON 4 MILLIGRAM(S): 8 TABLET, FILM COATED ORAL at 14:06

## 2020-01-15 NOTE — ED STATDOCS - ATTENDING CONTRIBUTION TO CARE
I, Kenneth Trivedi, performed the initial face to face bedside interview with this patient regarding history of present illness, review of symptoms and relevant past medical, social and family history.  I completed an independent physical examination.  I was the initial provider who evaluated this patient. I have signed out the follow up of any pending tests (i.e. labs, radiological studies) to the ACP.  I have communicated the patient’s plan of care and disposition with the ACP.  The history, relevant review of systems, past medical and surgical history, medical decision making, and physical examination was documented by the scribe in my presence and I attest to the accuracy of the documentation.

## 2020-01-15 NOTE — ED STATDOCS - OBJECTIVE STATEMENT
45 y/o F pt with no significant PMHx presents to the ED c/o right sided abdominal pain onset last night. Associated symptoms include nausea and vomiting. She reports that the symptoms onset with no precipitating factors. Current smoker. She states that she does not take any medication on a regular basis. Denies CP, SOB, hematuria, dysuria. No further acute complaints at this time.

## 2020-01-15 NOTE — ED STATDOCS - CARE PROVIDER_API CALL
Marian Kruse (DO)  Gastroenterology  [address], [address]  [place] [address]  Phone: [phone]  Fax: [phone]  Follow Up Time:

## 2020-01-15 NOTE — ED STATDOCS - PROGRESS NOTE DETAILS
Pt signed out pending abdominal CT to r/o appy. CT results noted and findings d/w pt. Pt deneis any CP, palp or SOB. Will check CXR to eval for cardiomegaly/vascular congestion and hepatitis panel. Pt instructed to f/u with GI for MRI of the liver to further eval hepatic lesions and periportal edema

## 2020-01-15 NOTE — ED STATDOCS - NSFOLLOWUPINSTRUCTIONS_ED_ALL_ED_FT
1. TAKE ALL MEDICATIONS AS DIRECTED.  FOR PAIN YOU CAN TAKE IBUPROFEN (MOTRIN, ADVIL) OR ACETAMINOPHEN (TYLENOL) AS NEEDED, AS DIRECTED ON PACKAGING.  2. FOLLOW UP WITH ___GI_______ AS DIRECTED.  YOU WERE GIVEN COPIES OF ALL LABS AND IMAGING RESULTS FROM YOUR ER VISIT--PLEASE TAKE THEM WITH YOU TO YOUR APPOINTMENT.  3. IF NEEDED, CALL [phone] TO FIND A PRIMARY CARE PHYSICIAN.  OR CALL [phone] TO MAKE AN APPOINTMENT WITH THE MEDICAL CLINIC.  4. RETURN TO THE ER FOR ANY WORSENING SYMPTOMS.    Please return to the emergency department immediately should you feel worse in any way or have any of the following symptoms:    •	especially increased or different pain  •	 fevers  •	persistent vomiting  •	shaking chills    Please return to the emergency department for a recheck in 12 hours so we can re-evaluate you and ensure that you are not developing a problem that would require surgery or hospitalization.      Please follow up with the Doctor listed within the time frame specified. Thank you for coming to the emergency department. We hope you are feeling improved and continue to get better. Have a nice day.

## 2020-01-15 NOTE — ED STATDOCS - CLINICAL SUMMARY MEDICAL DECISION MAKING FREE TEXT BOX
Patient with RLQ pain x 2 days. Concerning for appy vs right ovarian cyst. will obtain CT and US, and reevaluate.

## 2020-01-15 NOTE — ED ADULT TRIAGE NOTE - CHIEF COMPLAINT QUOTE
'I am having right lower abdominal pain I took Motrin but I am still having pain. I have an IUD and I want to see if they can remove it" Pt also c/o n/v.  Pt denies urinary symptoms.

## 2020-01-15 NOTE — ED STATDOCS - PATIENT PORTAL LINK FT
You can access the FollowMyHealth Patient Portal offered by [facility] by registering at the following website: [email or web address]. By joining [employer]’s FollowMyHealth portal, you will also be able to view your health information using other applications (apps) compatible with our system.

## 2020-01-16 LAB
HAV IGM SER-ACNC: SIGNIFICANT CHANGE UP
HBV CORE IGM SER-ACNC: SIGNIFICANT CHANGE UP
HBV SURFACE AG SER-ACNC: SIGNIFICANT CHANGE UP
HCV AB S/CO SERPL IA: 0.11 S/CO — SIGNIFICANT CHANGE UP (ref 0–0.99)
HCV AB SERPL-IMP: SIGNIFICANT CHANGE UP

## 2020-02-24 ENCOUNTER — APPOINTMENT (OUTPATIENT)
Dept: GASTROENTEROLOGY | Facility: CLINIC | Age: 45
End: 2020-02-24
Payer: MEDICAID

## 2020-02-24 VITALS
WEIGHT: 135 LBS | RESPIRATION RATE: 16 BRPM | HEIGHT: 62 IN | OXYGEN SATURATION: 98 % | SYSTOLIC BLOOD PRESSURE: 143 MMHG | HEART RATE: 72 BPM | DIASTOLIC BLOOD PRESSURE: 100 MMHG | BODY MASS INDEX: 24.84 KG/M2

## 2020-02-24 PROCEDURE — 99203 OFFICE O/P NEW LOW 30 MIN: CPT

## 2020-02-24 NOTE — PHYSICAL EXAM
[General Appearance - Alert] : alert [General Appearance - In No Acute Distress] : in no acute distress [Sclera] : the sclera and conjunctiva were normal [Extraocular Movements] : extraocular movements were intact [PERRL With Normal Accommodation] : pupils were equal in size, round, and reactive to light [Outer Ear] : the ears and nose were normal in appearance [Oropharynx] : the oropharynx was normal [Neck Cervical Mass (___cm)] : no neck mass was observed [Neck Appearance] : the appearance of the neck was normal [Jugular Venous Distention Increased] : there was no jugular-venous distention [Thyroid Diffuse Enlargement] : the thyroid was not enlarged [Thyroid Nodule] : there were no palpable thyroid nodules [Auscultation Breath Sounds / Voice Sounds] : lungs were clear to auscultation bilaterally [Heart Rate And Rhythm] : heart rate was normal and rhythm regular [Heart Sounds] : normal S1 and S2 [Heart Sounds Gallop] : no gallops [Heart Sounds Pericardial Friction Rub] : no pericardial rub [Murmurs] : no murmurs [Bowel Sounds] : normal bowel sounds [Abdomen Soft] : soft [Abdomen Tenderness] : non-tender [Abdomen Mass (___ Cm)] : no abdominal mass palpated [Abnormal Walk] : normal gait [Nail Clubbing] : no clubbing  or cyanosis of the fingernails [Musculoskeletal - Swelling] : no joint swelling seen [Motor Tone] : muscle strength and tone were normal [Skin Color & Pigmentation] : normal skin color and pigmentation [Skin Turgor] : normal skin turgor [] : no rash [Deep Tendon Reflexes (DTR)] : deep tendon reflexes were 2+ and symmetric [Sensation] : the sensory exam was normal to light touch and pinprick [No Focal Deficits] : no focal deficits [Oriented To Time, Place, And Person] : oriented to person, place, and time [Impaired Insight] : insight and judgment were intact [Affect] : the affect was normal

## 2020-02-24 NOTE — HISTORY OF PRESENT ILLNESS
[Heartburn] : denies heartburn [Nausea] : resolved nausea [Vomiting] : resolved vomiting [Diarrhea] : denies diarrhea [Constipation] : denies constipation [Yellow Skin Or Eyes (Jaundice)] : denies jaundice [Abdominal Pain] : resolved abdominal pain [Abdominal Swelling] : denies abdominal swelling [Rectal Pain] : denies rectal pain [_________] : Performed [unfilled] [de-identified] : NAOMY HIGGINBOTHAM is a 44 year old female presenting today s/p ER visit on 1/15/20 for severe RUQ pain, nausea, vomiting, and abnormal CT scan. She reports that she was having severe RUQ pain with associated nausea and vomiting for 2 days and decided to go to [facility] ER for evaluation. She had a full work up including blood work, Xray, US, and CT scan. Her labs were all WNL including liver enzymes and Hepatitis panel. The abdominal CT showed a 2.2 cm right hepatic lobe segment 4A hypodense lesion and a 2.1 left hepatic lobe segment 3 hypodense lesion, and periportal hepatic edema that was recommended to follow up with a CT w/ liver protocol or an abdominal MRI for further evaluation. There was also a 3.5 cm complex left ovarian cyst noted. She reports that since the ER visit her pain has improved although she will still get it occasionally, just not as severe. There is a questionable 10-12 lb weight loss since the ER visit last month. However she denies changes in appetite, any further nausea or vomiting. She denies any history of alcohol abuse and says she will only have a glass of wine occasionally. She denies any past or current history of drug use. She is a current smoker. She denies any acid reflux or heartburn, or changes in bowel habits. [de-identified] : hypodense liver lesions x2, periportal edema

## 2020-02-24 NOTE — ASSESSMENT
[FreeTextEntry1] : The patient presents today for evaluation of RUQ pain and newly discovered hepatic lesions. She will obtain a CT abdomen liver protocol to further evaluate the hepatic lesions. She will also be scheduled for an upper endoscopy to evaluate her upper abdominal pain. I have discussed the indications (including but not limited to ruling out Zarco's esophagus, AVM's, H. pylori, and malignancies), benefits, risks (including but not limited to reaction to the anesthesia, infection, bleeding, and perforation), and alternatives to an endoscopy. The patient understands all options and has agreed to endoscopy and is medically optimized for the planned procedure. \par We will obtain her recent labs from her PCP. \par She will follow up with her gynecologist about the complex left ovarian cyst. \par She will follow up in 1-2 months.

## 2020-03-20 ENCOUNTER — APPOINTMENT (OUTPATIENT)
Dept: CT IMAGING | Facility: CLINIC | Age: 45
End: 2020-03-20
Payer: MEDICAID

## 2020-03-20 ENCOUNTER — OUTPATIENT (OUTPATIENT)
Dept: OUTPATIENT SERVICES | Facility: HOSPITAL | Age: 45
LOS: 1 days | End: 2020-03-20
Payer: COMMERCIAL

## 2020-03-20 DIAGNOSIS — R10.11 RIGHT UPPER QUADRANT PAIN: ICD-10-CM

## 2020-03-20 DIAGNOSIS — R93.89 ABNORMAL FINDINGS ON DIAGNOSTIC IMAGING OF OTHER SPECIFIED BODY STRUCTURES: ICD-10-CM

## 2020-03-20 PROCEDURE — 74160 CT ABDOMEN W/CONTRAST: CPT | Mod: 26

## 2020-03-20 PROCEDURE — 74160 CT ABDOMEN W/CONTRAST: CPT

## 2020-03-31 ENCOUNTER — APPOINTMENT (OUTPATIENT)
Dept: OBGYN | Facility: CLINIC | Age: 45
End: 2020-03-31

## 2020-04-01 ENCOUNTER — APPOINTMENT (OUTPATIENT)
Dept: GASTROENTEROLOGY | Facility: GI CENTER | Age: 45
End: 2020-04-01

## 2020-04-20 ENCOUNTER — APPOINTMENT (OUTPATIENT)
Dept: GASTROENTEROLOGY | Facility: CLINIC | Age: 45
End: 2020-04-20
Payer: MEDICAID

## 2020-04-20 DIAGNOSIS — R10.11 RIGHT UPPER QUADRANT PAIN: ICD-10-CM

## 2020-04-20 DIAGNOSIS — R93.89 ABNORMAL FINDINGS ON DIAGNOSTIC IMAGING OF OTHER SPECIFIED BODY STRUCTURES: ICD-10-CM

## 2020-04-20 DIAGNOSIS — K76.9 LIVER DISEASE, UNSPECIFIED: ICD-10-CM

## 2020-04-20 PROCEDURE — 99442: CPT

## 2020-04-20 NOTE — HISTORY OF PRESENT ILLNESS
[de-identified] : Due to COVID 19 pandemic, telephonic visit was scheduled to decrease any chance of exposure. Verbal consent was obtained from the patient.\par The patient was evaluated for abdominal discomfort and abnormal liver imaging. There was periportal edema with liver lesions. CT abdomen was performed which revealed lesions which as per the radiology report seems to be benign but MRI was recommended. The patient abdominal pain has improved. She only gets very abdominal cramping. She is feeling better. She denies any rectal bleeding, weight loss, any other alarming GI symptoms. She had called gynecology for ovarian cyst followup. No appointment has been made so far.She is not on NSAIDs.

## 2020-04-20 NOTE — ASSESSMENT
[FreeTextEntry1] : I have recommended to obtain the labs and perform an MRI of abdomen as suggested on the CT report. We will do telephonic visit or office visit, based on the results. She may need an EGD if abdominal pain is persisted.\par \par I spent 11 minutes on the encounter.\par \par Graeme Simon MD\par Gastroenterology \par \par

## 2020-04-29 ENCOUNTER — APPOINTMENT (OUTPATIENT)
Dept: MRI IMAGING | Facility: CLINIC | Age: 45
End: 2020-04-29
Payer: MEDICAID

## 2020-04-29 ENCOUNTER — OUTPATIENT (OUTPATIENT)
Dept: OUTPATIENT SERVICES | Facility: HOSPITAL | Age: 45
LOS: 1 days | End: 2020-04-29
Payer: COMMERCIAL

## 2020-04-29 DIAGNOSIS — K76.9 LIVER DISEASE, UNSPECIFIED: ICD-10-CM

## 2020-04-29 DIAGNOSIS — Z00.00 ENCOUNTER FOR GENERAL ADULT MEDICAL EXAMINATION WITHOUT ABNORMAL FINDINGS: ICD-10-CM

## 2020-04-29 DIAGNOSIS — R93.89 ABNORMAL FINDINGS ON DIAGNOSTIC IMAGING OF OTHER SPECIFIED BODY STRUCTURES: ICD-10-CM

## 2020-04-29 PROCEDURE — 74183 MRI ABD W/O CNTR FLWD CNTR: CPT | Mod: 26

## 2020-04-29 PROCEDURE — 74183 MRI ABD W/O CNTR FLWD CNTR: CPT

## 2020-04-29 PROCEDURE — A9585: CPT

## 2020-06-05 DIAGNOSIS — Z01.818 ENCOUNTER FOR OTHER PREPROCEDURAL EXAMINATION: ICD-10-CM

## 2020-06-06 ENCOUNTER — APPOINTMENT (OUTPATIENT)
Dept: DISASTER EMERGENCY | Facility: CLINIC | Age: 45
End: 2020-06-06

## 2020-06-09 ENCOUNTER — APPOINTMENT (OUTPATIENT)
Dept: GASTROENTEROLOGY | Facility: GI CENTER | Age: 45
End: 2020-06-09

## 2020-07-13 ENCOUNTER — APPOINTMENT (OUTPATIENT)
Dept: OBGYN | Facility: CLINIC | Age: 45
End: 2020-07-13
Payer: MEDICAID

## 2020-07-13 ENCOUNTER — LABORATORY RESULT (OUTPATIENT)
Age: 45
End: 2020-07-13

## 2020-07-13 VITALS
WEIGHT: 122 LBS | SYSTOLIC BLOOD PRESSURE: 120 MMHG | HEIGHT: 61 IN | BODY MASS INDEX: 23.03 KG/M2 | DIASTOLIC BLOOD PRESSURE: 68 MMHG

## 2020-07-13 DIAGNOSIS — Z12.11 ENCOUNTER FOR SCREENING FOR MALIGNANT NEOPLASM OF COLON: ICD-10-CM

## 2020-07-13 DIAGNOSIS — N39.0 URINARY TRACT INFECTION, SITE NOT SPECIFIED: ICD-10-CM

## 2020-07-13 DIAGNOSIS — Z30.430 ENCOUNTER FOR INSERTION OF INTRAUTERINE CONTRACEPTIVE DEVICE: ICD-10-CM

## 2020-07-13 DIAGNOSIS — Z82.49 FAMILY HISTORY OF ISCHEMIC HEART DISEASE AND OTHER DISEASES OF THE CIRCULATORY SYSTEM: ICD-10-CM

## 2020-07-13 DIAGNOSIS — N89.8 OTHER SPECIFIED NONINFLAMMATORY DISORDERS OF VAGINA: ICD-10-CM

## 2020-07-13 DIAGNOSIS — N83.209 UNSPECIFIED OVARIAN CYST, UNSPECIFIED SIDE: ICD-10-CM

## 2020-07-13 DIAGNOSIS — F17.200 NICOTINE DEPENDENCE, UNSPECIFIED, UNCOMPLICATED: ICD-10-CM

## 2020-07-13 DIAGNOSIS — N92.6 IRREGULAR MENSTRUATION, UNSPECIFIED: ICD-10-CM

## 2020-07-13 DIAGNOSIS — K76.89 OTHER SPECIFIED DISEASES OF LIVER: ICD-10-CM

## 2020-07-13 LAB
DATE COLLECTED: NORMAL
HEMOCCULT SP1 STL QL: NEGATIVE
QUALITY CONTROL: YES

## 2020-07-13 PROCEDURE — 99386 PREV VISIT NEW AGE 40-64: CPT

## 2020-07-13 PROCEDURE — 82270 OCCULT BLOOD FECES: CPT

## 2020-07-13 RX ORDER — METHOCARBAMOL 750 MG/1
750 TABLET, FILM COATED ORAL
Refills: 0 | Status: DISCONTINUED | COMMUNITY
End: 2020-07-13

## 2020-07-13 NOTE — PHYSICAL EXAM
[Awake] : awake [Acute Distress] : no acute distress [Alert] : alert [Thyroid Nodule] : no thyroid nodule [LAD] : no lymphadenopathy [Goiter] : no goiter [Mass] : no breast mass [Nipple Discharge] : no nipple discharge [Soft] : soft [Tender] : non tender [Axillary LAD] : no axillary lymphadenopathy [Distended] : not distended [H/Smegaly] : no hepatosplenomegaly [Oriented x3] : oriented to person, place, and time [Flat Affect] : affect not flat [Depressed Mood] : not depressed [Labia Majora] : labia major [Normal] : uterus [Labia Minora] : labia minora [No Bleeding] : there was no active vaginal bleeding [IUD String] : had an IUD string protruding out [Enlarged ___ wks] : not enlarged [Tenderness] : nontender [Normal Position] : in a normal position [Mass ___ cm] : no uterine mass was palpated [Adnexa Tenderness] : were not tender [Uterine Adnexae] : were not tender and not enlarged [Ovarian Mass (___ Cm)] : there were no adnexal masses [No Tenderness] : no rectal tenderness [Occult Blood] : occult blood test from digital rectal exam was negative [de-identified] : breast exam: supine and upright

## 2020-07-17 LAB
APPEARANCE: ABNORMAL
BACTERIA: ABNORMAL
BILIRUBIN URINE: NEGATIVE
BLOOD URINE: NEGATIVE
C TRACH RRNA SPEC QL NAA+PROBE: NOT DETECTED
CALCIUM OXALATE CRYSTALS: ABNORMAL
CANDIDA VAG CYTO: NOT DETECTED
COLOR: YELLOW
G VAGINALIS+PREV SP MTYP VAG QL MICRO: DETECTED
GLUCOSE QUALITATIVE U: NEGATIVE
HYALINE CASTS: 0 /LPF
KETONES URINE: NORMAL
LEUKOCYTE ESTERASE URINE: ABNORMAL
MICROSCOPIC-UA: NORMAL
N GONORRHOEA RRNA SPEC QL NAA+PROBE: NOT DETECTED
NITRITE URINE: POSITIVE
PH URINE: 6
PROTEIN URINE: ABNORMAL
RED BLOOD CELLS URINE: 2 /HPF
SOURCE TP AMPLIFICATION: NORMAL
SPECIFIC GRAVITY URINE: 1.03
SQUAMOUS EPITHELIAL CELLS: 10 /HPF
T VAGINALIS VAG QL WET PREP: NOT DETECTED
UROBILINOGEN URINE: ABNORMAL
WHITE BLOOD CELLS URINE: 60 /HPF

## 2020-07-20 RX ORDER — CEPHALEXIN 500 MG/1
500 CAPSULE ORAL 3 TIMES DAILY
Qty: 21 | Refills: 0 | Status: ACTIVE | COMMUNITY
Start: 2020-07-17 | End: 1900-01-01

## 2020-07-31 RX ORDER — CLINDAMYCIN PHOSPHATE 20 MG/G
2 CREAM VAGINAL
Qty: 1 | Refills: 1 | Status: DISCONTINUED | COMMUNITY
Start: 2020-07-17 | End: 2020-07-31

## 2020-08-03 RX ORDER — METRONIDAZOLE 7.5 MG/G
0.75 GEL VAGINAL
Qty: 1 | Refills: 1 | Status: ACTIVE | COMMUNITY
Start: 2020-07-31 | End: 1900-01-01

## 2020-08-26 ENCOUNTER — APPOINTMENT (OUTPATIENT)
Dept: OBGYN | Facility: CLINIC | Age: 45
End: 2020-08-26

## 2020-12-23 PROBLEM — N39.0 ACUTE UTI: Status: RESOLVED | Noted: 2020-07-13 | Resolved: 2020-12-23

## 2021-02-23 LAB
CYTOLOGY CVX/VAG DOC THIN PREP: ABNORMAL
HPV HIGH+LOW RISK DNA PNL CVX: DETECTED

## 2021-07-20 NOTE — ED STATDOCS - DISCHARGE DATE
[facility] Call Center    Phone Message    May a detailed message be left on voicemail: yes     Reason for Call: Other: ,  Patient constantly has less than 90% oxygen concentration level resulting in confusion.    Action Taken: Message routed to:  [facility] primary care clinic    Travel Screening: Not Applicable                        .                                                
Spoke with [facility] home infusion nurse.    Pt's O2 sat was on 70 % without oxygen.  Pt was discharged with oxygen 2 L order, but she is not on oxygen frequently, causing low oxygen saturation. When she is back on oxygen, O2 sat goes up to 90 %.  I coordinated scheduling with PCP tomorrow for further evaluation. In the mean time, if she is having breathing problem or difficulty breathing, she will go to ED.  
06-Jun-2019

## 2024-12-13 NOTE — ED PROVIDER NOTE - DISPOSITION TYPE
DISCHARGE Render In Strict Bullet Format?: No Detail Level: Simple Initiate Treatment: tacrolimus 0.1 % topical ointment \\nSig: After shower, apply up to twice a day on the affected area of the scrotum.